# Patient Record
Sex: FEMALE
[De-identification: names, ages, dates, MRNs, and addresses within clinical notes are randomized per-mention and may not be internally consistent; named-entity substitution may affect disease eponyms.]

---

## 2021-12-27 ENCOUNTER — NURSE TRIAGE (OUTPATIENT)
Dept: OTHER | Facility: CLINIC | Age: 56
End: 2021-12-27

## 2021-12-27 NOTE — TELEPHONE ENCOUNTER
Subjective: Caller states \"I am needing advice on if I should get a COVID test\"     Current Symptoms: Sinus drainage, chest congestion, sore throat that gets better after being awake a few hours. Vaccinated for COVID-19 in May 2021. No known exposure to COVID-19  Wearing mask \"most of the time\"  Mild lightheadedness when moving. Hx:High blood pressure, gets \"chest cold\" every year per pt report, has needed nasal spray in the past    Onset: 3 days ago; waxing and waning    Associated Symptoms: ADLs normal    Pain Severity: Not asked    Temperature: Denies N/A    What has been tried: Nyquil , Sudafed    LMP: 5/2/21 Pregnant: No    Recommended disposition: Home care, pt advised that COVID-19 symptoms can vary from mild-severe and the only way to know what virus is causing symptoms is to be tested. Care advice provided, patient verbalizes understanding; denies any other questions or concerns; instructed to call back for any new or worsening symptoms. Pt states she will look for COVID-19 test in her area    This triage is a result of a call to [address] [place] [address]. Please do not respond to the triage nurse through this encounter. Any subsequent communication should be directly with the patient.       Reason for Disposition   Colds with no complications    Protocols used: COMMON COLD-ADULT-[place]

## 2023-06-01 PROBLEM — F32.A DEPRESSION: Status: ACTIVE | Noted: 2023-06-01

## 2023-06-01 PROBLEM — B35.6 TINEA CRURIS: Status: RESOLVED | Noted: 2023-06-01 | Resolved: 2023-06-01

## 2023-06-01 PROBLEM — J10.1 INFLUENZA A: Status: RESOLVED | Noted: 2023-06-01 | Resolved: 2023-06-01

## 2023-06-01 PROBLEM — F41.9 ANXIETY: Status: ACTIVE | Noted: 2023-06-01

## 2023-06-01 PROBLEM — N76.4 BOIL, VULVA: Status: RESOLVED | Noted: 2023-06-01 | Resolved: 2023-06-01

## 2023-06-01 PROBLEM — R50.9 FEVER: Status: RESOLVED | Noted: 2023-06-01 | Resolved: 2023-06-01

## 2023-06-01 PROBLEM — B37.31 VAGINITIS DUE TO CANDIDA ALBICANS: Status: RESOLVED | Noted: 2023-06-01 | Resolved: 2023-06-01

## 2023-06-01 PROBLEM — B35.4 TINEA CORPORIS: Status: RESOLVED | Noted: 2023-06-01 | Resolved: 2023-06-01

## 2023-06-01 PROBLEM — I10 BENIGN ESSENTIAL HYPERTENSION: Status: ACTIVE | Noted: 2023-06-01

## 2023-06-01 PROBLEM — E87.6 HYPOKALEMIA: Status: ACTIVE | Noted: 2023-06-01

## 2023-06-01 PROBLEM — M24.573 EQUINUS CONTRACTURE OF ANKLE: Status: ACTIVE | Noted: 2023-06-01

## 2023-06-01 PROBLEM — M79.7 FIBROMYALGIA: Status: ACTIVE | Noted: 2023-06-01

## 2023-06-01 RX ORDER — ASCORBIC ACID 500 MG
1 TABLET ORAL DAILY
COMMUNITY

## 2023-06-01 RX ORDER — CETIRIZINE HYDROCHLORIDE 10 MG/1
10 TABLET ORAL DAILY
COMMUNITY

## 2023-06-01 RX ORDER — POTASSIUM CHLORIDE 750 MG/1
10 TABLET, FILM COATED, EXTENDED RELEASE ORAL DAILY
COMMUNITY
Start: 2022-01-27 | End: 2023-08-17 | Stop reason: SDUPTHER

## 2023-06-01 RX ORDER — LISINOPRIL AND HYDROCHLOROTHIAZIDE 20; 25 MG/1; MG/1
1 TABLET ORAL DAILY
COMMUNITY
Start: 2021-06-24 | End: 2023-10-30 | Stop reason: SDUPTHER

## 2023-06-01 RX ORDER — CITALOPRAM 20 MG/1
20 TABLET, FILM COATED ORAL DAILY
COMMUNITY
End: 2023-10-30 | Stop reason: SDUPTHER

## 2023-06-01 RX ORDER — FLUTICASONE PROPIONATE 50 MCG
1 SPRAY, SUSPENSION (ML) NASAL DAILY
COMMUNITY

## 2023-06-01 RX ORDER — AMITRIPTYLINE HYDROCHLORIDE 25 MG/1
25 TABLET, FILM COATED ORAL NIGHTLY
COMMUNITY
End: 2023-10-30 | Stop reason: SDUPTHER

## 2023-06-05 ENCOUNTER — OFFICE VISIT (OUTPATIENT)
Dept: PRIMARY CARE | Facility: CLINIC | Age: 58
End: 2023-06-05
Payer: COMMERCIAL

## 2023-06-05 VITALS
BODY MASS INDEX: 29.68 KG/M2 | HEIGHT: 63 IN | SYSTOLIC BLOOD PRESSURE: 128 MMHG | HEART RATE: 88 BPM | WEIGHT: 167.5 LBS | OXYGEN SATURATION: 97 % | DIASTOLIC BLOOD PRESSURE: 68 MMHG

## 2023-06-05 DIAGNOSIS — M25.571 ACUTE RIGHT ANKLE PAIN: ICD-10-CM

## 2023-06-05 DIAGNOSIS — M25.561 ACUTE PAIN OF RIGHT KNEE: Primary | ICD-10-CM

## 2023-06-05 DIAGNOSIS — M54.50 ACUTE RIGHT-SIDED LOW BACK PAIN WITHOUT SCIATICA: ICD-10-CM

## 2023-06-05 PROBLEM — G89.29 CHRONIC RIGHT-SIDED LOW BACK PAIN WITHOUT SCIATICA: Status: ACTIVE | Noted: 2023-06-05

## 2023-06-05 PROCEDURE — 99214 OFFICE O/P EST MOD 30 MIN: CPT | Performed by: FAMILY MEDICINE

## 2023-06-05 PROCEDURE — 1036F TOBACCO NON-USER: CPT | Performed by: FAMILY MEDICINE

## 2023-06-05 PROCEDURE — 3074F SYST BP LT 130 MM HG: CPT | Performed by: FAMILY MEDICINE

## 2023-06-05 PROCEDURE — 3078F DIAST BP <80 MM HG: CPT | Performed by: FAMILY MEDICINE

## 2023-06-05 RX ORDER — MELOXICAM 15 MG/1
15 TABLET ORAL DAILY
Qty: 30 TABLET | Refills: 11 | Status: SHIPPED | OUTPATIENT
Start: 2023-06-05 | End: 2024-06-04

## 2023-06-05 ASSESSMENT — ENCOUNTER SYMPTOMS
PARESIS: 0
PERIANAL NUMBNESS: 0
DYSURIA: 0
FEVER: 0
BACK PAIN: 1
WEAKNESS: 0
TINGLING: 0
LEG PAIN: 1
PARESTHESIAS: 1
WEIGHT LOSS: 0
HEADACHES: 0
NUMBNESS: 1
BOWEL INCONTINENCE: 0
ABDOMINAL PAIN: 0

## 2023-06-05 ASSESSMENT — PATIENT HEALTH QUESTIONNAIRE - PHQ9
2. FEELING DOWN, DEPRESSED OR HOPELESS: NOT AT ALL
1. LITTLE INTEREST OR PLEASURE IN DOING THINGS: NOT AT ALL
SUM OF ALL RESPONSES TO PHQ9 QUESTIONS 1 AND 2: 0
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
## 2023-06-08 ENCOUNTER — TELEPHONE (OUTPATIENT)
Dept: PRIMARY CARE | Facility: CLINIC | Age: 58
End: 2023-06-08
Payer: COMMERCIAL

## 2023-06-08 NOTE — TELEPHONE ENCOUNTER
----- Message from Bobby Meza MD sent at 6/8/2023 12:14 PM EDT -----  Notify x-rays of the back hip knee and ankle do not show any fracture.

## 2023-07-25 LAB
ALANINE AMINOTRANSFERASE (SGPT) (U/L) IN SER/PLAS: 19 U/L (ref 7–45)
ALBUMIN (G/DL) IN SER/PLAS: 4.1 G/DL (ref 3.4–5)
ALKALINE PHOSPHATASE (U/L) IN SER/PLAS: 66 U/L (ref 33–110)
ANION GAP IN SER/PLAS: 11 MMOL/L (ref 10–20)
ASPARTATE AMINOTRANSFERASE (SGOT) (U/L) IN SER/PLAS: 21 U/L (ref 9–39)
BILIRUBIN TOTAL (MG/DL) IN SER/PLAS: 0.7 MG/DL (ref 0–1.2)
CALCIUM (MG/DL) IN SER/PLAS: 9.3 MG/DL (ref 8.6–10.3)
CARBON DIOXIDE, TOTAL (MMOL/L) IN SER/PLAS: 31 MMOL/L (ref 21–32)
CHLORIDE (MMOL/L) IN SER/PLAS: 103 MMOL/L (ref 98–107)
CHOLESTEROL (MG/DL) IN SER/PLAS: 207 MG/DL (ref 0–199)
CHOLESTEROL IN HDL (MG/DL) IN SER/PLAS: 51 MG/DL
CHOLESTEROL/HDL RATIO: 4.1
CREATININE (MG/DL) IN SER/PLAS: 0.7 MG/DL (ref 0.5–1.05)
GFR FEMALE: >90 ML/MIN/1.73M2
GLUCOSE (MG/DL) IN SER/PLAS: 84 MG/DL (ref 74–99)
LDL: 120 MG/DL (ref 0–99)
POTASSIUM (MMOL/L) IN SER/PLAS: 3.6 MMOL/L (ref 3.5–5.3)
PROTEIN TOTAL: 6.8 G/DL (ref 6.4–8.2)
SODIUM (MMOL/L) IN SER/PLAS: 141 MMOL/L (ref 136–145)
TRIGLYCERIDE (MG/DL) IN SER/PLAS: 181 MG/DL (ref 0–149)
UREA NITROGEN (MG/DL) IN SER/PLAS: 18 MG/DL (ref 6–23)
VLDL: 36 MG/DL (ref 0–40)

## 2023-08-16 ASSESSMENT — ENCOUNTER SYMPTOMS: HYPERTENSION: 1

## 2023-08-17 ENCOUNTER — OFFICE VISIT (OUTPATIENT)
Dept: PRIMARY CARE | Facility: CLINIC | Age: 58
End: 2023-08-17
Payer: COMMERCIAL

## 2023-08-17 VITALS
HEART RATE: 89 BPM | OXYGEN SATURATION: 98 % | BODY MASS INDEX: 30.07 KG/M2 | SYSTOLIC BLOOD PRESSURE: 120 MMHG | HEIGHT: 63 IN | DIASTOLIC BLOOD PRESSURE: 60 MMHG | WEIGHT: 169.7 LBS

## 2023-08-17 DIAGNOSIS — R53.82 CHRONIC FATIGUE: ICD-10-CM

## 2023-08-17 DIAGNOSIS — I10 BENIGN ESSENTIAL HYPERTENSION: Primary | ICD-10-CM

## 2023-08-17 DIAGNOSIS — F41.9 ANXIETY: ICD-10-CM

## 2023-08-17 LAB
COBALAMIN (VITAMIN B12) (PG/ML) IN SER/PLAS: 824 PG/ML (ref 211–911)
ERYTHROCYTE DISTRIBUTION WIDTH (RATIO) BY AUTOMATED COUNT: 12.3 % (ref 11.5–14.5)
ERYTHROCYTE MEAN CORPUSCULAR HEMOGLOBIN CONCENTRATION (G/DL) BY AUTOMATED: 33.9 G/DL (ref 32–36)
ERYTHROCYTE MEAN CORPUSCULAR VOLUME (FL) BY AUTOMATED COUNT: 84 FL (ref 80–100)
ERYTHROCYTES (10*6/UL) IN BLOOD BY AUTOMATED COUNT: 5.04 X10E12/L (ref 4–5.2)
HEMATOCRIT (%) IN BLOOD BY AUTOMATED COUNT: 42.2 % (ref 36–46)
HEMOGLOBIN (G/DL) IN BLOOD: 14.3 G/DL (ref 12–16)
LEUKOCYTES (10*3/UL) IN BLOOD BY AUTOMATED COUNT: 7.4 X10E9/L (ref 4.4–11.3)
PLATELETS (10*3/UL) IN BLOOD AUTOMATED COUNT: 249 X10E9/L (ref 150–450)
SEDIMENTATION RATE, ERYTHROCYTE: 15 MM/H (ref 0–30)
THYROTROPIN (MIU/L) IN SER/PLAS BY DETECTION LIMIT <= 0.05 MIU/L: 1.75 MIU/L (ref 0.44–3.98)

## 2023-08-17 PROCEDURE — 99213 OFFICE O/P EST LOW 20 MIN: CPT | Performed by: FAMILY MEDICINE

## 2023-08-17 PROCEDURE — 84443 ASSAY THYROID STIM HORMONE: CPT

## 2023-08-17 PROCEDURE — 3074F SYST BP LT 130 MM HG: CPT | Performed by: FAMILY MEDICINE

## 2023-08-17 PROCEDURE — 1036F TOBACCO NON-USER: CPT | Performed by: FAMILY MEDICINE

## 2023-08-17 PROCEDURE — 3078F DIAST BP <80 MM HG: CPT | Performed by: FAMILY MEDICINE

## 2023-08-17 PROCEDURE — 85027 COMPLETE CBC AUTOMATED: CPT

## 2023-08-17 PROCEDURE — 82607 VITAMIN B-12: CPT

## 2023-08-17 PROCEDURE — 85652 RBC SED RATE AUTOMATED: CPT

## 2023-08-17 RX ORDER — ACETAMINOPHEN 500 MG
1 TABLET ORAL EVERY OTHER DAY
COMMUNITY

## 2023-08-17 RX ORDER — MAGNESIUM 200 MG
1 TABLET ORAL DAILY
COMMUNITY

## 2023-08-17 RX ORDER — POTASSIUM CHLORIDE 750 MG/1
10 TABLET, FILM COATED, EXTENDED RELEASE ORAL DAILY
Qty: 90 TABLET | Refills: 3 | Status: SHIPPED | OUTPATIENT
Start: 2023-08-17 | End: 2024-08-16

## 2023-08-17 ASSESSMENT — ENCOUNTER SYMPTOMS
TROUBLE SWALLOWING: 0
NAUSEA: 0
NUMBNESS: 0
ABDOMINAL PAIN: 0
HYPERTENSION: 1
VOMITING: 0
ABDOMINAL DISTENTION: 0
FATIGUE: 1
DIZZINESS: 0
LIGHT-HEADEDNESS: 0
DYSPHORIC MOOD: 1
APPETITE CHANGE: 0
WHEEZING: 0
UNEXPECTED WEIGHT CHANGE: 0
ARTHRALGIAS: 1
CONSTIPATION: 0
ACTIVITY CHANGE: 0
SLEEP DISTURBANCE: 0
ADENOPATHY: 0
SHORTNESS OF BREATH: 0
RHINORRHEA: 0
PALPITATIONS: 0
HEADACHES: 0
COUGH: 0
DIARRHEA: 0
NERVOUS/ANXIOUS: 0
DECREASED CONCENTRATION: 0
DIFFICULTY URINATING: 0
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
## 2023-08-21 ENCOUNTER — TELEPHONE (OUTPATIENT)
Dept: PRIMARY CARE | Facility: CLINIC | Age: 58
End: 2023-08-21
Payer: COMMERCIAL

## 2023-08-21 NOTE — TELEPHONE ENCOUNTER
Mateo Aguilar MD    [id number] [id number] Clinical Support Staff  Please let the patient know that her additional blood testing was normal.    PATIENT NOTIFIED. VERBALIZED UNDERSTANDING

## 2023-10-11 ENCOUNTER — NURSE TRIAGE (OUTPATIENT)
Dept: OTHER | Facility: CLINIC | Age: 58
End: 2023-10-11

## 2023-10-11 NOTE — TELEPHONE ENCOUNTER
Location of patient: [place]    Subjective: Caller states Maryse Muir not been feeling well for 3 days, thought it was allergy drainage, settled in chest, sinuses, ears\"     Current Symptoms: clear nasal drainage, concerned with medication interactions for her bp meds and fibro meds. Chest congestion, slight cough, ears feel full, sinus congestion, pnd that causes some nausea, head feels kind of like it is \"lacking oxygen\", fatigue/lightheadedness    Onset: 4 days ago; unchanged    Associated Symptoms: NA    Pain Severity: 2/10; aching; intermittent headache    Temperature: denies fever     What has been tried: sudafed pe, guaifenesin, halls cough drops    LMP: NA Pregnant: NA    Recommended disposition: See PCP within 24 Hours    Care advice provided, patient verbalizes understanding; denies any other questions or concerns; instructed to call back for any new or worsening symptoms. Patient/caller agrees to follow-up with PCP     This triage is a result of a call to [address] [place] [address]. Please do not respond to the triage nurse through this encounter. Any subsequent communication should be directly with the patient.     Reason for Disposition   Taking a medicine that could cause dizziness (e.g., blood pressure medications, diuretics)    Protocols used: Dizziness - Lightheadedness-ADULT-[place]

## 2023-10-30 DIAGNOSIS — I10 BENIGN ESSENTIAL HYPERTENSION: Primary | ICD-10-CM

## 2023-10-30 DIAGNOSIS — F41.9 ANXIETY: ICD-10-CM

## 2023-10-30 DIAGNOSIS — M79.7 FIBROMYALGIA: ICD-10-CM

## 2023-10-30 RX ORDER — CITALOPRAM 20 MG/1
20 TABLET, FILM COATED ORAL DAILY
Qty: 90 TABLET | Refills: 3 | Status: SHIPPED | OUTPATIENT
Start: 2023-10-30 | End: 2024-10-29

## 2023-10-30 RX ORDER — LISINOPRIL AND HYDROCHLOROTHIAZIDE 20; 25 MG/1; MG/1
1 TABLET ORAL DAILY
Qty: 90 TABLET | Refills: 3 | Status: SHIPPED | OUTPATIENT
Start: 2023-10-30 | End: 2024-10-29

## 2023-10-30 RX ORDER — AMITRIPTYLINE HYDROCHLORIDE 25 MG/1
25 TABLET, FILM COATED ORAL NIGHTLY
Qty: 90 TABLET | Refills: 3 | Status: SHIPPED | OUTPATIENT
Start: 2023-10-30 | End: 2024-10-29

## 2024-03-11 ENCOUNTER — OFFICE VISIT (OUTPATIENT)
Dept: PRIMARY CARE | Facility: CLINIC | Age: 59
End: 2024-03-11
Payer: COMMERCIAL

## 2024-03-11 VITALS
OXYGEN SATURATION: 99 % | HEIGHT: 63 IN | DIASTOLIC BLOOD PRESSURE: 74 MMHG | HEART RATE: 84 BPM | WEIGHT: 171 LBS | BODY MASS INDEX: 30.3 KG/M2 | SYSTOLIC BLOOD PRESSURE: 140 MMHG

## 2024-03-11 DIAGNOSIS — R09.82 ALLERGIC RHINITIS WITH POSTNASAL DRIP: Primary | ICD-10-CM

## 2024-03-11 DIAGNOSIS — J30.9 ALLERGIC RHINITIS WITH POSTNASAL DRIP: Primary | ICD-10-CM

## 2024-03-11 PROCEDURE — 99213 OFFICE O/P EST LOW 20 MIN: CPT | Performed by: STUDENT IN AN ORGANIZED HEALTH CARE EDUCATION/TRAINING PROGRAM

## 2024-03-11 PROCEDURE — 3078F DIAST BP <80 MM HG: CPT | Performed by: STUDENT IN AN ORGANIZED HEALTH CARE EDUCATION/TRAINING PROGRAM

## 2024-03-11 PROCEDURE — 1036F TOBACCO NON-USER: CPT | Performed by: STUDENT IN AN ORGANIZED HEALTH CARE EDUCATION/TRAINING PROGRAM

## 2024-03-11 PROCEDURE — 3077F SYST BP >= 140 MM HG: CPT | Performed by: STUDENT IN AN ORGANIZED HEALTH CARE EDUCATION/TRAINING PROGRAM

## 2024-03-11 NOTE — PROGRESS NOTES
Subjective:  Jocelyn Rader is a 59 y.o. female who presents to clinic today for Earache (Sinus pain, pressure, dizzy, neg covid)      She notes a lot of sinus pressure and pain  - she had an ear infection a few months ago  - she notes that she was dizzy and was told it was going to burst her eardrum  - she takes her flonase daily for the last week  - she has not had any fevers      Review of Systems    Assessment/Plan:  Jocelyn Rader is a 59 y.o. female with a history of anxiety and depression who presents to clinic today to address the following issues:   1. Allergic rhinitis with postnasal drip          - Discussed how nasal anatomy works and how inflamed nasal turbinates can lead to postnasal drip and coughing  - discussed role of inhaled nasal steroids and how to take them, information was given in after visit summary  - discussed that fluid behind ear drums will likely improve with interventions but if not improving will recommend referral to ENT    Problem List Items Addressed This Visit    None  Visit Diagnoses       Allergic rhinitis with postnasal drip    -  Primary            Patient Instructions   Sinuses:    Every day twice daily    1) Nasal Saline    A small cup  Bag of table salt  1/4 teaspoon measure  Infant suction bulb    1/8-1/4 tsp salt in 2 oz of body temperature water    Suck up half and gently squirt in one nostril, repeat on the other side. Don't suck mucous out of your nose with the bulb.    Rinse bulb with hot water after each use and with alcohol once a week.    Wait 5 minutes    2) 1-2 puffs of Afrin in each nostril    Wait 10-15 minutes    3) 2 puffs Fluticasone in each nostril    Drop the Afrin after 3-5 days.    After two weeks, reduce that Flonase to 2 puffs once daily     Follow up: as needed    Return precautions discussed.  An After Visit Summary was given to the patient.  All questions were answered and patient in agreement with plan.    Objective:  /74    "Pulse 84   Ht 1.6 m (5' 3\")   Wt 77.6 kg (171 lb)   LMP 05/02/2021   SpO2 99%   BMI 30.29 kg/m²     Physical Exam  Vitals and nursing note reviewed.   Constitutional:       General: She is not in acute distress.     Appearance: Normal appearance.   HENT:      Head: Normocephalic and atraumatic.      Right Ear: Ear canal normal.      Left Ear: Ear canal normal.      Ears:      Comments: Clear fluid behind both ear drums     Nose: Congestion present.      Mouth/Throat:      Mouth: Mucous membranes are moist.      Pharynx: Posterior oropharyngeal erythema present.   Eyes:      General: No scleral icterus.        Right eye: No discharge.         Left eye: No discharge.      Extraocular Movements: Extraocular movements intact.      Conjunctiva/sclera: Conjunctivae normal.   Cardiovascular:      Rate and Rhythm: Normal rate and regular rhythm.   Pulmonary:      Effort: Pulmonary effort is normal. No respiratory distress.      Breath sounds: Normal breath sounds.   Skin:     General: Skin is warm and dry.   Neurological:      General: No focal deficit present.      Mental Status: She is alert and oriented to person, place, and time.   Psychiatric:         Attention and Perception: Attention normal.         Mood and Affect: Mood normal.         Speech: Speech normal.         Behavior: Behavior normal.         Cognition and Memory: Cognition and memory normal.         Judgment: Judgment normal.         I spent 10 minutes in total time for this visit including all related clinical activities before, during, and after the visit excluding other billable activities/procedure time.     Ammy Lyons MD    "

## 2024-03-11 NOTE — PATIENT INSTRUCTIONS
Sinuses:    Every day twice daily    1) Nasal Saline    A small cup  Bag of table salt  1/4 teaspoon measure  Infant suction bulb    1/8-1/4 tsp salt in 2 oz of body temperature water    Suck up half and gently squirt in one nostril, repeat on the other side. Don't suck mucous out of your nose with the bulb.    Rinse bulb with hot water after each use and with alcohol once a week.    Wait 5 minutes    2) 1-2 puffs of Afrin in each nostril    Wait 10-15 minutes    3) 2 puffs Fluticasone in each nostril    Drop the Afrin after 3-5 days.    After two weeks, reduce that Flonase to 2 puffs once daily

## 2024-05-20 ENCOUNTER — APPOINTMENT (OUTPATIENT)
Dept: OBSTETRICS AND GYNECOLOGY | Facility: CLINIC | Age: 59
End: 2024-05-20
Payer: COMMERCIAL

## 2024-06-05 ENCOUNTER — OFFICE VISIT (OUTPATIENT)
Dept: OBSTETRICS AND GYNECOLOGY | Facility: CLINIC | Age: 59
End: 2024-06-05
Payer: COMMERCIAL

## 2024-06-05 VITALS
SYSTOLIC BLOOD PRESSURE: 118 MMHG | HEIGHT: 63 IN | WEIGHT: 169.8 LBS | DIASTOLIC BLOOD PRESSURE: 64 MMHG | BODY MASS INDEX: 30.09 KG/M2

## 2024-06-05 DIAGNOSIS — Z12.4 PAP SMEAR FOR CERVICAL CANCER SCREENING: ICD-10-CM

## 2024-06-05 DIAGNOSIS — Z12.31 ENCOUNTER FOR SCREENING MAMMOGRAM FOR MALIGNANT NEOPLASM OF BREAST: ICD-10-CM

## 2024-06-05 DIAGNOSIS — Z01.419 ENCOUNTER FOR GYNECOLOGICAL EXAMINATION WITHOUT ABNORMAL FINDING: ICD-10-CM

## 2024-06-05 PROCEDURE — 3074F SYST BP LT 130 MM HG: CPT | Performed by: OBSTETRICS & GYNECOLOGY

## 2024-06-05 PROCEDURE — 99396 PREV VISIT EST AGE 40-64: CPT | Performed by: OBSTETRICS & GYNECOLOGY

## 2024-06-05 PROCEDURE — 1036F TOBACCO NON-USER: CPT | Performed by: OBSTETRICS & GYNECOLOGY

## 2024-06-05 PROCEDURE — 3078F DIAST BP <80 MM HG: CPT | Performed by: OBSTETRICS & GYNECOLOGY

## 2024-06-05 PROCEDURE — 88175 CYTOPATH C/V AUTO FLUID REDO: CPT

## 2024-06-05 NOTE — PROGRESS NOTES
"Jocelyn Rader is a 59 y.o. female who is here for a routine exam. PCP = Mateo Aguilar MD    Chief Complaint   Patient presents with    Gynecologic Exam     Patient is here for a yearly exam. Patient has no concerns today. LMP: JESSICA        Presents for annual exam. She voices no complaints and is doing well. Denies any bowel or bladder problems. Denies any breast problems.  Denies any postmenopausal bleeding.    OB History          2    Para   2    Term   2       0    AB   0    Living   2         SAB   0    IAB   0    Ectopic   0    Multiple   0    Live Births   2                  Past Medical History:   Diagnosis Date    Boil, vulva 2023    Encounter for full-term uncomplicated delivery ([facility])     Normal vaginal delivery    Encounter for gynecological examination (general) (routine) without abnormal findings 2022    Pap test, as part of routine gynecological examination    Other conditions influencing health status     Menstruation    Personal history of other medical treatment 2020    History of screening mammography    Tinea corporis 2023    Tinea cruris 2023       Past Surgical History:   Procedure Laterality Date    BREAST BIOPSY Right     angiolipoma    CERVICAL BIOPSY  W/ LOOP ELECTRODE EXCISION      OTHER SURGICAL HISTORY  2019    Ear surgery    OTHER SURGICAL HISTORY  2019    Stapedectomy       Past med hx and past surg hx reviewed and notable for: none    Review of Systems:   Constitutional: No fever or chills  Respiratory: No shortness of breath, or cough  Cardiovascular: No chest pain or syncope  Breasts: No breast pain, no masses, no nipple discharge  Gastrointestinal: No nausea, vomiting, or diarrhea, no abdominal pain  Genitourinary: No dysuria or frequency  Gynecology: Negative except as noted in history of present illness  All other: All other systems reviewed and negative for complaint    Objective   /64   Ht 1.6 m (5' 3\")   " Wt 77 kg (169 lb 12.8 oz)   LMP 05/02/2021   BMI 30.08 kg/m²     PHYSICAL EXAMINATION:  Well-developed, well nourished, in no acute distress, alert and oriented x three, is pleasant and cooperative.   HEENT: Clear. Pupils equal, round and reactive to light and accommodation. Extraocular muscles are intact. Oral mucosa pink without exudate.   NECK: No lymphadenopathy, no thyromegaly.  BREASTS: Symmetric, no palpable masses. No nipple discharge or retraction.  LUNGS: Clear bilaterally.  HEART: Regular rate and rhythm without murmurs.  ABDOMEN: Normoactive bowel sounds, soft and nontender, no guarding or rebound tenderness, no CVA tenderness.  EXTREMITIES: No clubbing, cyanosis or edema.  NEUROLOGIC:  Cranial nerves II-XII grossly intact.  :  Normal external female genitalia, normal vulva, normal vagina. Normal urethral meatus, urethra and bladder. Normal appearing cervix. Normal-sized uterus, no adnexal masses or tenderness. Pap smear performed today.      Actions performed during this visit include:  - Clinical breast exam  - Clinical pelvic exam  -   Orders Placed This Encounter   Procedures    BI mammo bilateral screening tomosynthesis     Standing Status:   Future     Standing Expiration Date:   7/5/2025     Order Specific Question:   Is the patient pregnant?     Answer:   No     Order Specific Question:   Reason for exam:     Answer:   Screening     Order Specific Question:   Radiologist to Determine Optimal Study     Answer:   Yes     Order Specific Question:   [facility]     Answer:   Immediate [1]     Order Specific Question:   Is this exam part of a Research Study? If Yes, link this order to the research study     Answer:   No        Problem List Items Addressed This Visit    None  Visit Diagnoses       Encounter for gynecological examination without abnormal finding        Relevant Orders    THINPREP PAP    Pap smear for cervical cancer screening        Relevant Orders    THINPREP PAP     Encounter for screening mammogram for malignant neoplasm of breast        Relevant Orders    BI mammo bilateral screening tomosynthesis             Provider Impression:  1.  Annual  2.  Screening mammogram    Thank you for coming to your annual exam. Your findings during the exam were normal.  Please return for your next visit in 1 year.

## 2024-06-06 ENCOUNTER — HOSPITAL ENCOUNTER (OUTPATIENT)
Dept: RADIOLOGY | Facility: CLINIC | Age: 59
Discharge: HOME | End: 2024-06-06
Payer: COMMERCIAL

## 2024-06-06 VITALS — BODY MASS INDEX: 29.95 KG/M2 | HEIGHT: 63 IN | WEIGHT: 169 LBS

## 2024-06-06 DIAGNOSIS — Z12.31 ENCOUNTER FOR SCREENING MAMMOGRAM FOR MALIGNANT NEOPLASM OF BREAST: ICD-10-CM

## 2024-06-06 PROCEDURE — 77067 SCR MAMMO BI INCL CAD: CPT | Performed by: RADIOLOGY

## 2024-06-06 PROCEDURE — 77067 SCR MAMMO BI INCL CAD: CPT

## 2024-06-06 PROCEDURE — 77063 BREAST TOMOSYNTHESIS BI: CPT | Performed by: RADIOLOGY

## 2024-06-12 LAB
CYTOLOGY CMNT CVX/VAG CYTO-IMP: NORMAL
LAB AP HPV GENOTYPE QUESTION: YES
LAB AP HPV HR: NORMAL
LABORATORY COMMENT REPORT: NORMAL
MENSTRUAL HX REPORTED: NORMAL
PATH REPORT.TOTAL CANCER: NORMAL

## 2024-08-05 DIAGNOSIS — I10 BENIGN ESSENTIAL HYPERTENSION: ICD-10-CM

## 2024-08-20 ENCOUNTER — APPOINTMENT (OUTPATIENT)
Dept: PRIMARY CARE | Facility: CLINIC | Age: 59
End: 2024-08-20
Payer: COMMERCIAL

## 2024-08-20 VITALS
DIASTOLIC BLOOD PRESSURE: 90 MMHG | OXYGEN SATURATION: 99 % | HEART RATE: 93 BPM | WEIGHT: 170.6 LBS | BODY MASS INDEX: 30.23 KG/M2 | HEIGHT: 63 IN | SYSTOLIC BLOOD PRESSURE: 130 MMHG

## 2024-08-20 DIAGNOSIS — Z12.11 ENCOUNTER FOR SCREENING FOR MALIGNANT NEOPLASM OF COLON: ICD-10-CM

## 2024-08-20 DIAGNOSIS — I10 BENIGN ESSENTIAL HYPERTENSION: Primary | ICD-10-CM

## 2024-08-20 DIAGNOSIS — F41.9 ANXIETY: ICD-10-CM

## 2024-08-20 DIAGNOSIS — M79.7 FIBROMYALGIA: ICD-10-CM

## 2024-08-20 PROBLEM — E87.6 HYPOKALEMIA: Status: RESOLVED | Noted: 2023-06-01 | Resolved: 2024-08-20

## 2024-08-20 PROBLEM — M54.50 ACUTE RIGHT-SIDED LOW BACK PAIN WITHOUT SCIATICA: Status: RESOLVED | Noted: 2023-06-05 | Resolved: 2024-08-20

## 2024-08-20 PROBLEM — M25.571 ACUTE RIGHT ANKLE PAIN: Status: RESOLVED | Noted: 2023-06-05 | Resolved: 2024-08-20

## 2024-08-20 PROBLEM — M25.561 ACUTE PAIN OF RIGHT KNEE: Status: RESOLVED | Noted: 2023-06-05 | Resolved: 2024-08-20

## 2024-08-20 PROCEDURE — 3080F DIAST BP >= 90 MM HG: CPT | Performed by: FAMILY MEDICINE

## 2024-08-20 PROCEDURE — 99213 OFFICE O/P EST LOW 20 MIN: CPT | Performed by: FAMILY MEDICINE

## 2024-08-20 PROCEDURE — 1036F TOBACCO NON-USER: CPT | Performed by: FAMILY MEDICINE

## 2024-08-20 PROCEDURE — 3075F SYST BP GE 130 - 139MM HG: CPT | Performed by: FAMILY MEDICINE

## 2024-08-20 PROCEDURE — 3008F BODY MASS INDEX DOCD: CPT | Performed by: FAMILY MEDICINE

## 2024-08-20 RX ORDER — MELOXICAM 15 MG/1
15 TABLET ORAL DAILY PRN
COMMUNITY
End: 2024-08-20 | Stop reason: SDUPTHER

## 2024-08-20 RX ORDER — MELOXICAM 15 MG/1
15 TABLET ORAL DAILY PRN
Qty: 90 TABLET | Refills: 0 | Status: SHIPPED | OUTPATIENT
Start: 2024-08-20 | End: 2024-11-18

## 2024-08-20 RX ORDER — POTASSIUM CHLORIDE 750 MG/1
10 TABLET, FILM COATED, EXTENDED RELEASE ORAL DAILY
Qty: 90 TABLET | Refills: 3 | Status: SHIPPED | OUTPATIENT
Start: 2024-08-20 | End: 2025-08-20

## 2024-08-20 RX ORDER — AMITRIPTYLINE HYDROCHLORIDE 25 MG/1
25 TABLET, FILM COATED ORAL NIGHTLY
Qty: 90 TABLET | Refills: 3 | Status: SHIPPED | OUTPATIENT
Start: 2024-08-20 | End: 2025-08-20

## 2024-08-20 RX ORDER — CITALOPRAM 20 MG/1
20 TABLET, FILM COATED ORAL DAILY
Qty: 90 TABLET | Refills: 3 | Status: SHIPPED | OUTPATIENT
Start: 2024-08-20 | End: 2025-08-20

## 2024-08-20 RX ORDER — LISINOPRIL AND HYDROCHLOROTHIAZIDE 20; 25 MG/1; MG/1
1 TABLET ORAL DAILY
Qty: 90 TABLET | Refills: 3 | Status: SHIPPED | OUTPATIENT
Start: 2024-08-20 | End: 2025-08-20

## 2024-08-20 RX ORDER — POTASSIUM CHLORIDE 750 MG/1
10 TABLET, EXTENDED RELEASE ORAL DAILY
Qty: 90 TABLET | Refills: 3 | OUTPATIENT
Start: 2024-08-20

## 2024-08-20 ASSESSMENT — ENCOUNTER SYMPTOMS
FATIGUE: 0
ABDOMINAL DISTENTION: 0
TROUBLE SWALLOWING: 0
WHEEZING: 0
PALPITATIONS: 0
DIFFICULTY URINATING: 0
SWEATS: 0
NAUSEA: 0
ABDOMINAL PAIN: 0
ORTHOPNEA: 0
BLURRED VISION: 0
APPETITE CHANGE: 0
DIARRHEA: 0
NUMBNESS: 0
COUGH: 0
NECK PAIN: 0
SHORTNESS OF BREATH: 0
HEADACHES: 0
ARTHRALGIAS: 0
HYPERTENSION: 1
CONSTIPATION: 0
LIGHT-HEADEDNESS: 0
ACTIVITY CHANGE: 0
DIZZINESS: 0
UNEXPECTED WEIGHT CHANGE: 0
RHINORRHEA: 0
VOMITING: 0
PND: 0
ADENOPATHY: 0

## 2024-08-20 NOTE — PROGRESS NOTES
"Subjective   Patient ID: Jocelyn Rader is a 59 y.o. female who presents for Annual Exam.    Hypertension  This is a chronic problem. The current episode started more than 1 year ago. The problem is unchanged. The problem is controlled. Associated symptoms include malaise/fatigue. Pertinent negatives include no anxiety, blurred vision, chest pain, headaches, neck pain, orthopnea, palpitations, peripheral edema, PND, shortness of breath or sweats. Agents associated with hypertension include NSAIDs. Risk factors for coronary artery disease include family history, post-menopausal state and stress. There are no compliance problems.       No headache, chest pain, shortness of breath, dizziness, lightheadedness, or edema  Seen GYN in June, had MMG/pap  Stress with father's health, in hospice care, caring for father at home, dealing with stress      Review of Systems   Constitutional:  Positive for malaise/fatigue. Negative for activity change, appetite change, fatigue and unexpected weight change.   HENT:  Negative for ear pain, nosebleeds, rhinorrhea, sneezing and trouble swallowing.    Eyes:  Negative for blurred vision.   Respiratory:  Negative for cough, shortness of breath and wheezing.    Cardiovascular:  Negative for chest pain, palpitations, orthopnea, leg swelling and PND.   Gastrointestinal:  Negative for abdominal distention, abdominal pain, constipation, diarrhea, nausea and vomiting.   Genitourinary:  Negative for difficulty urinating.   Musculoskeletal:  Negative for arthralgias and neck pain.   Skin:  Negative for rash.   Neurological:  Negative for dizziness, light-headedness, numbness and headaches.   Hematological:  Negative for adenopathy.   Psychiatric/Behavioral:  Negative for behavioral problems.    All other systems reviewed and are negative.      Objective   /90   Pulse 93   Ht 1.6 m (5' 3\")   Wt 77.4 kg (170 lb 9.6 oz)   LMP 05/02/2021   SpO2 99%   BMI 30.22 kg/m²     Physical " May 13, 2019       Rowan Silva PA-C  [address] [facility] [address]  VIA Facsimile: [phone]      Patient: Yusuf Reyes   YOB: 2004   Date of Visit: 5/13/2019       Dear Dr. Silva:    I saw your patient, Yusuf Reyes, for an evaluation. Below are my notes for this visit with him.    If you have questions, please do not hesitate to call me.      Sincerely,        MD Osmar Da Silva MD  5/13/2019  8:12 PM  Sign at close encounter  Pediatric Cardiology Consultation    Referring Physician:  Rowan Silva PA-C    Chief Complaint   Patient presents with   • Follow-up   • Hypertension        SUBJECTIVE  HPI: Yusuf Reyes is a 14  Yrs 9  Mos male with history of High blood pressure, here in [facility] with his father for cardiac follow-up examination.     Yusuf had a last visit with me on 9/26/2016 in [facility] due to innocent heart murmur and high blood pressure.  His electrocardiogram and echocardiogram were normal.  Pediatric nephrology consultation was requested.  He was evaluated by Dr. Krishan Vela, pediatric nephrologist on 10/9/2017.  Lab works and kidney ultrasound were normal.  There was no nephrology follow-up.  He is still noted to have high blood pressure during routine physical examination with his primary care provider.  He is playing football this year.  He used to play basketball in the past with no problem.  There is no apparent history of chest pain, palpitations, exercise intolerance, shortness of breath, cyanosis, or syncope.  His father has no other concern.    REVIEW OF SYSTEM  Review of Systems   Constitutional: Negative for activity change, appetite change, chills, diaphoresis, fatigue and fever.   HENT: Positive for congestion. Negative for dental problem, drooling, ear discharge, ear pain, facial swelling, hearing loss, nosebleeds, tinnitus and trouble swallowing.    Eyes: Negative for  Exam  Vitals and nursing note reviewed.   Constitutional:       General: She is not in acute distress.     Appearance: Normal appearance. She is not toxic-appearing.   HENT:      Head: Normocephalic and atraumatic.      Right Ear: Tympanic membrane, ear canal and external ear normal.      Left Ear: Tympanic membrane, ear canal and external ear normal.      Nose: Nose normal.      Mouth/Throat:      Mouth: Mucous membranes are moist.      Pharynx: Oropharynx is clear.   Eyes:      Extraocular Movements: Extraocular movements intact.      Conjunctiva/sclera: Conjunctivae normal.      Pupils: Pupils are equal, round, and reactive to light.   Cardiovascular:      Rate and Rhythm: Normal rate and regular rhythm.      Pulses: Normal pulses.      Heart sounds: Normal heart sounds.   Pulmonary:      Effort: Pulmonary effort is normal.      Breath sounds: Normal breath sounds.   Abdominal:      General: Abdomen is flat. Bowel sounds are normal.      Palpations: Abdomen is soft.   Musculoskeletal:      Cervical back: Normal range of motion and neck supple.   Skin:     General: Skin is warm and dry.      Capillary Refill: Capillary refill takes less than 2 seconds.   Neurological:      General: No focal deficit present.      Mental Status: She is alert and oriented to person, place, and time. Mental status is at baseline.   Psychiatric:         Mood and Affect: Mood normal.         Behavior: Behavior normal.         Assessment/Plan   Problem List Items Addressed This Visit             ICD-10-CM    Anxiety F41.9     Continue with current medications.  Currently under more stress, but seems to be coping well.         Relevant Medications    citalopram (CeleXA) 20 mg tablet    Other Relevant Orders    Follow Up In Primary Care - Established    Benign essential hypertension - Primary I10     Blood pressure under good control, electrolyte and renal function are stable.         Relevant Medications    lisinopriL-hydrochlorothiazide  photophobia, pain, discharge, redness, itching and visual disturbance.   Respiratory: Positive for cough. Negative for apnea, choking, wheezing and stridor.    Cardiovascular: Negative for chest pain, palpitations and leg swelling.   Gastrointestinal: Negative for abdominal distention, abdominal pain, blood in stool, constipation, diarrhea, nausea and vomiting.   Endocrine: Negative for cold intolerance, heat intolerance, polydipsia, polyphagia and polyuria.   Genitourinary: Negative for difficulty urinating, dysuria, enuresis, flank pain, frequency, hematuria and urgency.   Musculoskeletal: Negative for arthralgias, back pain, gait problem, joint swelling, myalgias, neck pain and neck stiffness.   Skin: Negative for color change, pallor and rash.   Allergic/Immunologic: Positive for environmental allergies. Negative for food allergies and immunocompromised state.   Neurological: Negative for dizziness, tremors, seizures, syncope, facial asymmetry, speech difficulty, weakness, light-headedness and headaches.   Hematological: Negative for adenopathy. Does not bruise/bleed easily.   Psychiatric/Behavioral: Negative for agitation, behavioral problems, confusion, hallucinations, self-injury and sleep disturbance. The patient is not hyperactive.      Past Medical History:   Diagnosis Date   • Essential (primary) hypertension    • FTND (full term normal delivery)      Past Surgical History:   Procedure Laterality Date   • No past surgeries         Family History   Problem Relation Age of Onset   • Hypertension Mother    • Hypertension Maternal Grandmother    • Hypertension Paternal Grandmother    • Hypertension Maternal Grandfather    • Diabetes Maternal Grandfather    • Hypertension Father    • Patient is unaware of any medical problems Sister    • Patient is unaware of any medical problems Paternal Grandfather    • Arrhythmia Neg Hx    • Long QT Syndrome Neg Hx    • Myocardial Infarction Neg Hx    • Stroke Neg Hx    •  20-25 mg tablet    potassium chloride CR (Klor-Con) 10 mEq ER tablet    Other Relevant Orders    Comprehensive Metabolic Panel    Follow Up In Primary Care - Established    Fibromyalgia M79.7     Meloxicam helpful, sleeping when she can, continue with current treatment.         Relevant Medications    amitriptyline (Elavil) 25 mg tablet    meloxicam (Mobic) 15 mg tablet    Other Relevant Orders    Follow Up In Primary Care - Established     Other Visit Diagnoses         Codes    Encounter for screening for malignant neoplasm of colon     Z12.11    Relevant Orders    Cologuard® colon cancer screening    Follow Up In Primary Care - Established                Pacemaker Neg Hx    • Congenital Heart Disease Neg Hx      Social History     Socioeconomic History   • Marital status: Single     Spouse name: Not on file   • Number of children: Not on file   • Years of education: Not on file   • Highest education level: Not on file   Occupational History   • Not on file   Social Needs   • Financial resource strain: Not on file   • Food insecurity:     Worry: Not on file     Inability: Not on file   • Transportation needs:     Medical: Not on file     Non-medical: Not on file   Tobacco Use   • Smoking status: Never Smoker   • Smokeless tobacco: Never Used   Substance and Sexual Activity   • Alcohol use: Not on file   • Drug use: Not on file   • Sexual activity: Not on file   Lifestyle   • Physical activity:     Days per week: Not on file     Minutes per session: Not on file   • Stress: Not on file   Relationships   • Social connections:     Talks on phone: Not on file     Gets together: Not on file     Attends Shinto service: Not on file     Active member of club or organization: Not on file     Attends meetings of clubs or organizations: Not on file     Relationship status: Not on file   • Intimate partner violence:     Fear of current or ex partner: Not on file     Emotionally abused: Not on file     Physically abused: Not on file     Forced sexual activity: Not on file   Other Topics Concern   • Not on file   Social History Narrative   • Not on file     No current outpatient medications on file.     No current facility-administered medications for this visit.      ALLERGIES:  No Known Allergies    Diagnostic history:  Kidney ultrasound on 10/10/2017 at [facility] showed normal both kidneys.  On 10/10/2017: CBC, CMP showed normal study.  Urine analysis showed small protein urea.    Echocardiogram on 9/26/2016 In [facility] showed  1. No left to right shunt lesions  2. Normal all 4 cardiac valves  3. Normal all chamber dimensions  4. No ventricular hypertrophy  5.  No pulmonary hypertension  6. Normal cardiac function.     OBJECTIVE  Visit Vitals  /77 (BP [facility] [place] [facility], Patient Position: Sitting, Cuff Size: Regular)   Pulse 85   Ht 5' 9.88\" (1.775 m)   Wt 73.9 kg (162 lb 14.7 oz)   SpO2 97%   BMI 23.46 kg/m²     Repeat blood pressure over her right arm in sitting position using a regular adult size cuff: 142/80 5 mmHg, heart rate 89/min.    Body surface area is 1.91 meters squared.    BMI is 89 %ile (Z= 1.24) based on CDC (Boys, 2-20 Years) BMI-for-age based on BMI available as of 2019.    Blood pressure percentiles are 99 % systolic and 84 % diastolic based on the 2017 AAP Clinical Practice Guideline. Blood pressure percentile targets: 90: 128/79, 95: 133/83, 95 + 12 mmH/95. This reading is in the Stage 2 hypertension range (BP >= 140/90).    The height is at 97 %ile (Z= 1.91) based on CDC (Boys, 2-20 Years) Stature-for-age data based on Stature recorded on 2019.    The weight is at 96 %ile (Z= 1.80) based on CDC (Boys, 2-20 Years) weight-for-age data using vitals from 2019.    Physical Exam    Constitutional: in no acute distress and interactive.  Muscular, not obese.    Head and Face: normocephalic and atraumatic. no dysmorphic features were observed.      Eyes: no discharge and normal conjunctiva. the sclerae were normal.      ENT: normal appearing outer ear and normal appearing nose. no nasal discharge. normal lips. oral mucosa pink and moist.      Neck: normal appearing neck and supple neck.      Chest: no thoracic asymmetry, no bulging precordium and no chest deformity.     Surgical Scar(s): None.      Pulmonary: no respiratory distress. no subcostal retractions. breath sounds clear to auscultation bilaterally.      Cardiovascular: The heart rate was normal. The rhythm was regular. Heart sounds: normal S1, normal S2, no gallop, no click, no pericardial rub.   Murmurs: There is grade 1/6 to 2/6 systolic ejection murmur over left sternal  border.  There is no diastolic murmur.   Radial Pulse: right 2+ and left 2+.   Posterior tibial pulse: right 2+ and left 2+. no pulse delay and capillary refill < 2 secs.   Lower Extremities: no edema present.      Abdomen: soft, nontender and nondistended. no hepatomegaly and no splenomegaly.      Lymphatic: no cervical lymphadenopathy.      Musculoskeletal: muscle strength and tone were grossly normal.      Neurologic: no coordination deficits observed and developmentally appropriate for age. the mood/affect was appropriate for age.      Skin: normal skin color and pigmentation, no bruises, no rash, no central cyanosis and no peripheral cyanosis. normal skin turgor.     Patient Active Problem List    Diagnosis Date Noted   • Hypertension 05/13/2019     Priority: Low   • Heart murmur 05/12/2019     Priority: Low     Orders Placed This Encounter   • Electrocardiogram 12-Lead   • 24 hour blood pressure monitor      Relevant Testing:  ECG: Electrocardiogram today on 5/13/2019 in [facility] shows normal sinus rhythm, heart rate 80/min, no delta wave, slightly ST elevation from early repolarization, normal corrected QT interval 380 ms, normal electrocardiogram.    ASSESSMENT:  Yusuf has elevated blood pressure.  Otherwise he has normal cardiovascular examination.  I would like to have 24 hours ambulatory blood pressure monitor to evaluate his blood pressure at home.  It will be done at [facility] in [place], [place].  The phone number is given to father.  Even though he stated that he does not feel nervous or anxious, however his heart rate seemed to be slightly high for athletes.  I explained to his father that if his blood pressure is really abnormal, kidney work-up and echocardiogram will be done before antihypertensive therapy.    Final Diagnosis:   R01.1 Heart murmur  (primary encounter diagnosis)  I10 Hypertension, unspecified type    RECOMMENDATIONS:  1. Feeding and Nutrition: Leading  a healthy lifestyle, eating plenty of fruits and vegetables, eating whole grain foods, minimizing junk food and exercising regularly is important for cardiac health.  Low-salt diet.  2. Medications:  No new medications.  Medications to avoid:  None.   3. Diagnostic tests: We are waiting for blood pressure monitor result.  4. SBE prophylaxis:  Not required.  5. Immunizations:  Routine immunizations should be provided, including influenza for patients over 6 months of age.   6. Exercise restrictions:  There should be no restriction placed on her activity from a cardiac standpoint. Yusuf Reyes is clear for participation in all activities from a cardiac viewpoint, and needs no special cardiac precautions.   7. Surgical/Anesthesia Concerns:  Based on the available history and data, the patient is at no greater risk than the general population for surgery, anesthesia, or sedation.  8. Patient education:  To contact us for any new, concerning, or recurrent symptoms.  9. Follow up:  A return visit to cardiology clinic is not set up, pending blood pressure monitor result.  Routine follow up with the primary doctor is recommended.    Yusuf and his father expressed understanding and agreement with the above recommendations.  All questions were answered.    Osmar Dudley MD  Pediatric Cardiology

## 2024-10-08 ENCOUNTER — LAB (OUTPATIENT)
Dept: LAB | Facility: LAB | Age: 59
End: 2024-10-08
Payer: COMMERCIAL

## 2024-10-08 DIAGNOSIS — I10 BENIGN ESSENTIAL HYPERTENSION: ICD-10-CM

## 2024-10-08 LAB
ALBUMIN SERPL BCP-MCNC: 4 G/DL (ref 3.4–5)
ALP SERPL-CCNC: 62 U/L (ref 33–110)
ALT SERPL W P-5'-P-CCNC: 22 U/L (ref 7–45)
ANION GAP SERPL CALC-SCNC: 10 MMOL/L (ref 10–20)
AST SERPL W P-5'-P-CCNC: 20 U/L (ref 9–39)
BILIRUB SERPL-MCNC: 0.5 MG/DL (ref 0–1.2)
BUN SERPL-MCNC: 16 MG/DL (ref 6–23)
CALCIUM SERPL-MCNC: 9 MG/DL (ref 8.6–10.3)
CHLORIDE SERPL-SCNC: 104 MMOL/L (ref 98–107)
CO2 SERPL-SCNC: 32 MMOL/L (ref 21–32)
CREAT SERPL-MCNC: 0.77 MG/DL (ref 0.5–1.05)
EGFRCR SERPLBLD CKD-EPI 2021: 90 ML/MIN/1.73M*2
GLUCOSE SERPL-MCNC: 90 MG/DL (ref 74–99)
POTASSIUM SERPL-SCNC: 3.8 MMOL/L (ref 3.5–5.3)
PROT SERPL-MCNC: 6.2 G/DL (ref 6.4–8.2)
SODIUM SERPL-SCNC: 142 MMOL/L (ref 136–145)

## 2024-10-08 PROCEDURE — 80053 COMPREHEN METABOLIC PANEL: CPT

## 2024-10-08 PROCEDURE — 36415 COLL VENOUS BLD VENIPUNCTURE: CPT

## 2024-10-27 LAB — NONINV COLON CA DNA+OCC BLD SCRN STL QL: POSITIVE

## 2024-10-28 ENCOUNTER — TELEPHONE (OUTPATIENT)
Age: 59
End: 2024-10-28
Payer: COMMERCIAL

## 2024-10-28 DIAGNOSIS — R19.5 POSITIVE COLORECTAL CANCER SCREENING USING COLOGUARD TEST: ICD-10-CM

## 2024-10-28 NOTE — RESULT ENCOUNTER NOTE
Please let the patient know that her Cologuard testing was positive, I do recommend doing a colonoscopy to follow-up this abnormal test.

## 2024-10-29 DIAGNOSIS — R19.5 POSITIVE COLORECTAL CANCER SCREENING USING COLOGUARD TEST: ICD-10-CM

## 2024-10-29 DIAGNOSIS — Z12.11 COLON CANCER SCREENING: ICD-10-CM

## 2024-11-20 ENCOUNTER — HOSPITAL ENCOUNTER (OUTPATIENT)
Dept: GASTROENTEROLOGY | Facility: CLINIC | Age: 59
Discharge: HOME | End: 2024-11-20
Payer: COMMERCIAL

## 2024-11-20 VITALS
SYSTOLIC BLOOD PRESSURE: 114 MMHG | TEMPERATURE: 97.9 F | HEART RATE: 84 BPM | WEIGHT: 171.6 LBS | BODY MASS INDEX: 30.41 KG/M2 | HEIGHT: 63 IN | OXYGEN SATURATION: 98 % | DIASTOLIC BLOOD PRESSURE: 75 MMHG | RESPIRATION RATE: 16 BRPM

## 2024-11-20 DIAGNOSIS — R19.5 POSITIVE COLORECTAL CANCER SCREENING USING COLOGUARD TEST: ICD-10-CM

## 2024-11-20 DIAGNOSIS — Z12.11 COLON CANCER SCREENING: ICD-10-CM

## 2024-11-20 PROCEDURE — 7100000010 HC PHASE TWO TIME - EACH INCREMENTAL 1 MINUTE

## 2024-11-20 PROCEDURE — 7100000009 HC PHASE TWO TIME - INITIAL BASE CHARGE

## 2024-11-20 PROCEDURE — 2500000004 HC RX 250 GENERAL PHARMACY W/ HCPCS (ALT 636 FOR OP/ED): Mod: JZ | Performed by: INTERNAL MEDICINE

## 2024-11-20 PROCEDURE — 3700000012 HC SEDATION LEVEL 5+ TIME - INITIAL 15 MINUTES 5/> YEARS

## 2024-11-20 PROCEDURE — 99152 MOD SED SAME PHYS/QHP 5/>YRS: CPT | Performed by: INTERNAL MEDICINE

## 2024-11-20 PROCEDURE — 45378 DIAGNOSTIC COLONOSCOPY: CPT | Performed by: INTERNAL MEDICINE

## 2024-11-20 RX ORDER — MIDAZOLAM HYDROCHLORIDE 1 MG/ML
INJECTION, SOLUTION INTRAMUSCULAR; INTRAVENOUS AS NEEDED
Status: COMPLETED | OUTPATIENT
Start: 2024-11-20 | End: 2024-11-20

## 2024-11-20 RX ORDER — MEPERIDINE HYDROCHLORIDE 50 MG/ML
INJECTION INTRAMUSCULAR; INTRAVENOUS; SUBCUTANEOUS AS NEEDED
Status: COMPLETED | OUTPATIENT
Start: 2024-11-20 | End: 2024-11-20

## 2024-11-20 RX ORDER — MEPERIDINE HYDROCHLORIDE 25 MG/ML
INJECTION INTRAMUSCULAR; INTRAVENOUS; SUBCUTANEOUS AS NEEDED
Status: COMPLETED | OUTPATIENT
Start: 2024-11-20 | End: 2024-11-20

## 2024-11-20 ASSESSMENT — PAIN - FUNCTIONAL ASSESSMENT
PAIN_FUNCTIONAL_ASSESSMENT: 0-10
PAIN_FUNCTIONAL_ASSESSMENT: 0-10

## 2024-11-20 ASSESSMENT — PAIN SCALES - GENERAL
PAINLEVEL_OUTOF10: 0 - NO PAIN

## 2024-11-20 NOTE — H&P
History Of Present Illness  Jocelyn Rader is a 59 y.o. female presenting with colon cancer screening.     Past Medical History  Past Medical History:   Diagnosis Date    Anxiety     Boil, vulva 06/01/2023    Depression     Encounter for full-term uncomplicated delivery     Normal vaginal delivery    Encounter for gynecological examination (general) (routine) without abnormal findings 04/01/2022    Pap test, as part of routine gynecological examination    Hypertension     Other conditions influencing health status     Menstruation    Personal history of other medical treatment 02/05/2020    History of screening mammography    Tinea corporis 06/01/2023    Tinea cruris 06/01/2023     Surgical History  Past Surgical History:   Procedure Laterality Date    BREAST BIOPSY Right     angiolipoma    CERVICAL BIOPSY  W/ LOOP ELECTRODE EXCISION      OTHER SURGICAL HISTORY  11/26/2019    Ear surgery    OTHER SURGICAL HISTORY  11/26/2019    Stapedectomy     Social History  She reports that she has never smoked. She has never been exposed to tobacco smoke. She has never used smokeless tobacco. She reports that she does not drink alcohol and does not use drugs.    Family History  Family History   Problem Relation Name Age of Onset    Breast cancer Mother  43    Other (CVA) Father      Hypertension Father      Dementia Father      Liver cancer Father      Hyperlipidemia Sister      Hypertension Sister      Skin cancer Sister      Hyperlipidemia Brother      Hypertension Brother      Pancreatic cancer Son      Pancreatic cancer Maternal Grandfather          Allergies  Allergies   Allergen Reactions    Animal Dander Unknown    Prednisone Other and Rash     CHEST TIGHTNESS & RASH     Review of Systems  Pre-sedation Evaluation:  ASA Classification - ASA 2 - Patient with mild systemic disease with no functional limitations  Mallampati Score - II (hard and soft palate, upper portion of tonsils and uvula visible)    Physical  "Exam  Vitals and nursing note reviewed.   Constitutional:       Appearance: Normal appearance.   HENT:      Head: Normocephalic.      Mouth/Throat:      Mouth: Mucous membranes are moist.      Pharynx: Oropharynx is clear.   Eyes:      Conjunctiva/sclera: Conjunctivae normal.      Pupils: Pupils are equal, round, and reactive to light.   Cardiovascular:      Rate and Rhythm: Normal rate and regular rhythm.      Heart sounds: Normal heart sounds.   Pulmonary:      Effort: Pulmonary effort is normal.      Breath sounds: Normal breath sounds.   Abdominal:      General: Abdomen is flat. Bowel sounds are normal.      Palpations: Abdomen is soft.   Musculoskeletal:      Cervical back: Normal range of motion and neck supple.   Skin:     General: Skin is warm and dry.   Neurological:      General: No focal deficit present.      Mental Status: She is alert and oriented to person, place, and time.   Psychiatric:         Behavior: Behavior normal.          Last Recorded Vitals  Blood pressure 148/81, pulse 84, temperature 35.8 °C (96.5 °F), temperature source Temporal, resp. rate 16, height 1.6 m (5' 3\"), weight 77.8 kg (171 lb 9.6 oz), last menstrual period 05/02/2021, SpO2 100%.     Assessment/Plan   Problem List Items Addressed This Visit    None  Visit Diagnoses       Colon cancer screening        Relevant Orders    Colonoscopy Screening; Average Risk Patient    Positive colorectal cancer screening using Cologuard test        Relevant Orders    Colonoscopy Screening; Average Risk Patient               PTA/Current Medications:  (Not in a hospital admission)    Current Outpatient Medications   Medication Sig Dispense Refill    amitriptyline (Elavil) 25 mg tablet Take 1 tablet (25 mg) by mouth once daily at bedtime. 90 tablet 3    ascorbic acid (Vitamin C) 500 mg tablet Take 1 tablet (500 mg) by mouth once daily.      B-complex with vitamin C (SUPER B COMPLEX-VITAMIN C ORAL) Take 1 tablet by mouth once daily.      cetirizine " (ZyrTEC) 10 mg tablet Take 1 tablet (10 mg) by mouth once daily.      cholecalciferol (Vitamin D3) 5,000 Units tablet Take 1 tablet (5,000 Units) by mouth every other day.      citalopram (CeleXA) 20 mg tablet Take 1 tablet (20 mg) by mouth once daily. 90 tablet 3    cranberry fruit extract (CRANBERRY CONCENTRATE ORAL) Take by mouth.      fluticasone (Flonase) 50 mcg/actuation nasal spray Administer 1 spray into affected nostril(s) once daily.      lisinopriL-hydrochlorothiazide 20-25 mg tablet Take 1 tablet by mouth once daily. 90 tablet 3    magnesium 200 mg tablet Take 1 tablet (200 mg) by mouth early in the morning..      multivitamin with minerals (multivitamin-iron-folic acid) tablet Take 1 tablet by mouth once daily.      potassium chloride CR (Klor-Con) 10 mEq ER tablet Take 1 tablet (10 mEq) by mouth once daily. 90 tablet 3    ZINC ORAL Take 1 tablet by mouth every other day.       No current facility-administered medications for this encounter.     Rubio June, DO

## 2024-11-20 NOTE — DISCHARGE INSTRUCTIONS
Patient Instructions after a Colonoscopy      The anesthetics, sedatives or narcotics which were given to you today will be acting in your body for the next 24 hours, so you might feel a little sleepy or groggy.  This feeling should slowly wear off. Carefully read and follow the instructions.     You received sedation today:  - Do not drive or operate any machinery or power tools of any kind.   - No alcoholic beverages today, not even beer or wine.  - Do not make any important decisions or sign any legal documents.  - No over the counter medications that contain alcohol or that may cause drowsiness.  - Do not make any important decisions or sign any legal documents.  - Make sure you have someone with you for first 24 hours.    While it is common to experience mild to moderate abdominal distention, gas, or belching after your procedure, if any of these symptoms occur following discharge from the GI Lab or within one week of having your procedure, call the [facility] to be advised whether a visit to your nearest Urgent Care or Emergency Department is indicated.  Take this paper with you if you go.     - If you develop an allergic reaction to the medications that were given during your procedure such as difficulty breathing, rash, hives, severe nausea, vomiting or lightheadedness.  - If you experience chest pain, shortness of breath, severe abdominal pain, fevers and chills.  -If you develop signs and symptoms of bleeding such as blood in your spit, if your stools turn black, tarry, or bloody  - If you have not urinated within 8 hours following your procedure.  - If your IV site becomes painful, red, inflamed, or looks infected.    If you received a biopsy/polypectomy/sphincterotomy the following instructions apply below:    __ Do not use Aspirin containing products, non-steroidal medications or anti-coagulants for one week following your procedure. (Examples of these types of medications are: Advil,  Arthrotec, Aleve, Coumadin, Ecotrin, Heparin, Ibuprofen, Indocin, Motrin, Naprosyn, Nuprin, Plavix, Vioxx, and Voltarin, or their generic forms.  This list is not all-inclusive.  Check with your physician or pharmacist before resuming medications.)   __ Eat a soft diet today.  Avoid foods that are poorly digested for the next 24 hours.  These foods would include: nuts, beans, lettuce, red meats, and fried foods. Start with liquids and advance your diet as tolerated, gradually work up to eating solids.   __ Do not have a Barium Study or Enema for one week.    Your physician recommends the additional following instructions:    -You have a contact number available for emergencies. The signs and symptoms of potential delayed complications were discussed with you. You may return to normal activities tomorrow.  -Resume your previous diet.  -Continue your present medications.   -We are waiting for your pathology results.  -Your physician has recommended a repeat colonoscopy (date to be determined after pending pathology results are reviewed) for surveillance based on pathology results.  -The findings and recommendations have been discussed with you.  -The findings and recommendations were discussed with your family.  - Please see Medication Reconciliation Form for new medication/medications prescribed.       If you experience any problems or have any questions following discharge from the GI Lab, please call:        Nurse Signature                                                                        Date___________________                                                                            Patient/Responsible Party Signature                                        Date___________________

## 2025-06-09 ENCOUNTER — APPOINTMENT (OUTPATIENT)
Dept: OBSTETRICS AND GYNECOLOGY | Facility: CLINIC | Age: 60
End: 2025-06-09
Payer: COMMERCIAL

## 2025-06-09 VITALS — HEIGHT: 63 IN | BODY MASS INDEX: 30.4 KG/M2

## 2025-06-09 DIAGNOSIS — Z12.4 PAP SMEAR FOR CERVICAL CANCER SCREENING: ICD-10-CM

## 2025-06-09 DIAGNOSIS — Z01.419 ENCOUNTER FOR GYNECOLOGICAL EXAMINATION WITHOUT ABNORMAL FINDING: ICD-10-CM

## 2025-06-09 DIAGNOSIS — Z12.31 ENCOUNTER FOR SCREENING MAMMOGRAM FOR MALIGNANT NEOPLASM OF BREAST: ICD-10-CM

## 2025-06-09 PROCEDURE — 1036F TOBACCO NON-USER: CPT | Performed by: OBSTETRICS & GYNECOLOGY

## 2025-06-09 PROCEDURE — 99396 PREV VISIT EST AGE 40-64: CPT | Performed by: OBSTETRICS & GYNECOLOGY

## 2025-06-09 PROCEDURE — 99459 PELVIC EXAMINATION: CPT | Performed by: OBSTETRICS & GYNECOLOGY

## 2025-06-09 ASSESSMENT — ANXIETY QUESTIONNAIRES
4. TROUBLE RELAXING: NOT AT ALL
IF YOU CHECKED OFF ANY PROBLEMS ON THIS QUESTIONNAIRE, HOW DIFFICULT HAVE THESE PROBLEMS MADE IT FOR YOU TO DO YOUR WORK, TAKE CARE OF THINGS AT HOME, OR GET ALONG WITH OTHER PEOPLE: NOT DIFFICULT AT ALL
7. FEELING AFRAID AS IF SOMETHING AWFUL MIGHT HAPPEN: NOT AT ALL
3. WORRYING TOO MUCH ABOUT DIFFERENT THINGS: NOT AT ALL
GAD7 TOTAL SCORE: 0
6. BECOMING EASILY ANNOYED OR IRRITABLE: NOT AT ALL
1. FEELING NERVOUS, ANXIOUS, OR ON EDGE: NOT AT ALL
5. BEING SO RESTLESS THAT IT IS HARD TO SIT STILL: NOT AT ALL
2. NOT BEING ABLE TO STOP OR CONTROL WORRYING: NOT AT ALL

## 2025-06-09 ASSESSMENT — SOCIAL DETERMINANTS OF HEALTH (SDOH)
WITHIN THE LAST YEAR, HAVE TO BEEN RAPED OR FORCED TO HAVE ANY KIND OF SEXUAL ACTIVITY BY YOUR PARTNER OR EX-PARTNER?: NO
WITHIN THE LAST YEAR, HAVE YOU BEEN HUMILIATED OR EMOTIONALLY ABUSED IN OTHER WAYS BY YOUR PARTNER OR EX-PARTNER?: NO
WITHIN THE LAST YEAR, HAVE YOU BEEN KICKED, HIT, SLAPPED, OR OTHERWISE PHYSICALLY HURT BY YOUR PARTNER OR EX-PARTNER?: NO
WITHIN THE LAST YEAR, HAVE YOU BEEN AFRAID OF YOUR PARTNER OR EX-PARTNER?: NO

## 2025-06-09 ASSESSMENT — LIFESTYLE VARIABLES
HOW OFTEN DO YOU HAVE SIX OR MORE DRINKS ON ONE OCCASION: NEVER
HOW MANY STANDARD DRINKS CONTAINING ALCOHOL DO YOU HAVE ON A TYPICAL DAY: PATIENT DOES NOT DRINK
SKIP TO QUESTIONS 9-10: 1
AUDIT-C TOTAL SCORE: 0
HOW OFTEN DO YOU HAVE A DRINK CONTAINING ALCOHOL: NEVER

## 2025-06-09 ASSESSMENT — PATIENT HEALTH QUESTIONNAIRE - PHQ9
2. FEELING DOWN, DEPRESSED OR HOPELESS: NOT AT ALL
SUM OF ALL RESPONSES TO PHQ9 QUESTIONS 1 AND 2: 0
1. LITTLE INTEREST OR PLEASURE IN DOING THINGS: NOT AT ALL

## 2025-06-09 NOTE — PROGRESS NOTES
"Jocelyn Rader is a 60 y.o. female who is here for a routine exam. PCP = Mateo Aguilar MD    Chief Complaint   Patient presents with    Gynecologic Exam     Pt here for yearly exam lmp-viki. Pt does do self breast exams. Pt has no concerns        Presents for annual exam. She voices no complaints and is doing well. Denies any bowel or bladder problems. Denies any breast problems.  Denies any postmenopausal bleeding.    OB History          2    Para   2    Term   2       0    AB   0    Living   2         SAB   0    IAB   0    Ectopic   0    Multiple   0    Live Births   2                  Medical History[1]    Surgical History[2]    Past med hx and past surg hx reviewed and notable for: none    Review of Systems:   Constitutional: No fever or chills  Respiratory: No shortness of breath, or cough  Cardiovascular: No chest pain or syncope  Breasts: No breast pain, no masses, no nipple discharge  Gastrointestinal: No nausea, vomiting, or diarrhea, no abdominal pain  Genitourinary: No dysuria or frequency  Gynecology: Negative except as noted in history of present illness  All other: All other systems reviewed and negative for complaint    Objective   Ht 1.6 m (5' 3\")   LMP 2021   BMI 30.40 kg/m²     PHYSICAL EXAMINATION:  Chaperone present for exam:  Belén (Svetlana) BARB Segovia  Well-developed, well nourished, in no acute distress, alert and oriented x three, is pleasant and cooperative.   HEENT: Clear. Pupils equal, round and reactive to light and accommodation. Extraocular muscles are intact. Oral mucosa pink without exudate.   NECK: No lymphadenopathy, no thyromegaly.  BREASTS: Symmetric, no palpable masses. No nipple discharge or retraction.  LUNGS: Clear bilaterally.  HEART: Regular rate and rhythm without murmurs.  ABDOMEN: Normoactive bowel sounds, soft and nontender, no guarding or rebound tenderness, no CVA tenderness.  EXTREMITIES: No clubbing, cyanosis or edema.  NEUROLOGIC:  " Cranial nerves II-XII grossly intact.  :  Normal external female genitalia, normal vulva, normal vagina. Normal urethral meatus, urethra and bladder. Normal appearing cervix. Normal-sized uterus, no adnexal masses or tenderness. Pap smear performed today.      Actions performed during this visit include:  - Clinical breast exam  - Clinical pelvic exam  -   Orders Placed This Encounter   Procedures    BI mammo bilateral screening tomosynthesis     Standing Status:   Future     Expected Date:   6/9/2025     Expiration Date:   7/9/2026     Is the patient pregnant?:   No     Reason for exam::   Screening     Radiologist to Determine Optimal Study:   Yes     [facility] to [facility]:   Immediate [1]     Is this exam part of a Research Study? If Yes, link this order to the research study:   No        Problem List Items Addressed This Visit    None  Visit Diagnoses         Encounter for gynecological examination without abnormal finding        Relevant Orders    THINPREP PAP TEST      Pap smear for cervical cancer screening        Relevant Orders    THINPREP PAP TEST      Encounter for screening mammogram for malignant neoplasm of breast        Relevant Orders    BI mammo bilateral screening tomosynthesis             Provider Impression:  1.  Annual  2.  Screening mammogram    Thank you for coming to your annual exam. Your findings during the exam were normal.  Please return for your next visit in 1 year.       [1]   Past Medical History:  Diagnosis Date    Anxiety     Boil, vulva 06/01/2023    Depression     Encounter for full-term uncomplicated delivery     Normal vaginal delivery    Encounter for gynecological examination (general) (routine) without abnormal findings 04/01/2022    Pap test, as part of routine gynecological examination    Hypertension     Other conditions influencing health status     Menstruation    Personal history of other medical treatment 02/05/2020    History of screening mammography    Tinea  corporis 06/01/2023    Tinea cruris 06/01/2023   [2]   Past Surgical History:  Procedure Laterality Date    BREAST BIOPSY Right     angiolipoma    CERVICAL BIOPSY  W/ LOOP ELECTRODE EXCISION      OTHER SURGICAL HISTORY  11/26/2019    Ear surgery    OTHER SURGICAL HISTORY  11/26/2019    Stapedectomy

## 2025-06-16 ENCOUNTER — HOSPITAL ENCOUNTER (OUTPATIENT)
Dept: RADIOLOGY | Facility: CLINIC | Age: 60
Discharge: HOME | End: 2025-06-16
Payer: COMMERCIAL

## 2025-06-16 ENCOUNTER — APPOINTMENT (OUTPATIENT)
Dept: RADIOLOGY | Facility: CLINIC | Age: 60
End: 2025-06-16
Payer: COMMERCIAL

## 2025-06-16 VITALS — HEIGHT: 63 IN | WEIGHT: 171.6 LBS | BODY MASS INDEX: 30.41 KG/M2

## 2025-06-16 DIAGNOSIS — Z12.31 ENCOUNTER FOR SCREENING MAMMOGRAM FOR MALIGNANT NEOPLASM OF BREAST: ICD-10-CM

## 2025-06-16 PROCEDURE — 77063 BREAST TOMOSYNTHESIS BI: CPT

## 2025-06-16 PROCEDURE — 77067 SCR MAMMO BI INCL CAD: CPT | Performed by: RADIOLOGY

## 2025-06-16 PROCEDURE — 77063 BREAST TOMOSYNTHESIS BI: CPT | Performed by: RADIOLOGY

## 2025-08-21 ENCOUNTER — APPOINTMENT (OUTPATIENT)
Age: 60
End: 2025-08-21
Payer: COMMERCIAL

## 2025-09-02 ENCOUNTER — APPOINTMENT (OUTPATIENT)
Age: 60
End: 2025-09-02
Payer: COMMERCIAL

## 2025-09-02 ASSESSMENT — ENCOUNTER SYMPTOMS
WHEEZING: 0
FATIGUE: 0
VOMITING: 0
ADENOPATHY: 0
ABDOMINAL PAIN: 0
COUGH: 0
ACTIVITY CHANGE: 0
DIARRHEA: 0
APPETITE CHANGE: 0
NUMBNESS: 0
UNEXPECTED WEIGHT CHANGE: 0
HEADACHES: 0
DIZZINESS: 0
SHORTNESS OF BREATH: 0
LIGHT-HEADEDNESS: 0
ABDOMINAL DISTENTION: 0
ARTHRALGIAS: 0
NAUSEA: 0
RHINORRHEA: 0
PALPITATIONS: 0
NERVOUS/ANXIOUS: 0
SLEEP DISTURBANCE: 0
DYSPHORIC MOOD: 0
DIFFICULTY URINATING: 0
TROUBLE SWALLOWING: 0
CONSTIPATION: 0

## 2025-09-02 ASSESSMENT — PROMIS GLOBAL HEALTH SCALE
RATE_AVERAGE_FATIGUE: MILD
RATE_QUALITY_OF_LIFE: GOOD
RATE_MENTAL_HEALTH: VERY GOOD
RATE_SOCIAL_SATISFACTION: EXCELLENT
EMOTIONAL_PROBLEMS: NEVER
RATE_PHYSICAL_HEALTH: GOOD
CARRYOUT_PHYSICAL_ACTIVITIES: COMPLETELY
RATE_GENERAL_HEALTH: VERY GOOD
CARRYOUT_SOCIAL_ACTIVITIES: EXCELLENT
RATE_AVERAGE_PAIN: 4

## 2026-06-17 ENCOUNTER — APPOINTMENT (OUTPATIENT)
Facility: CLINIC | Age: 61
End: 2026-06-17
Payer: COMMERCIAL

## 2026-09-10 ENCOUNTER — APPOINTMENT (OUTPATIENT)
Age: 61
End: 2026-09-10
Payer: COMMERCIAL